# Patient Record
Sex: MALE | Race: BLACK OR AFRICAN AMERICAN | Employment: STUDENT | URBAN - METROPOLITAN AREA
[De-identification: names, ages, dates, MRNs, and addresses within clinical notes are randomized per-mention and may not be internally consistent; named-entity substitution may affect disease eponyms.]

---

## 2017-10-08 ENCOUNTER — HOSPITAL ENCOUNTER (OUTPATIENT)
Age: 22
Discharge: HOME | End: 2017-10-08
Attending: FAMILY MEDICINE
Payer: MEDICAID

## 2017-10-08 ENCOUNTER — APPOINTMENT (OUTPATIENT)
Dept: GENERAL RADIOLOGY | Age: 22
End: 2017-10-08
Attending: FAMILY MEDICINE
Payer: MEDICAID

## 2017-10-08 VITALS
HEART RATE: 62 BPM | TEMPERATURE: 98 F | OXYGEN SATURATION: 100 % | DIASTOLIC BLOOD PRESSURE: 90 MMHG | SYSTOLIC BLOOD PRESSURE: 142 MMHG | RESPIRATION RATE: 16 BRPM

## 2017-10-08 DIAGNOSIS — S63.501A FOREARM SPRAIN, RIGHT, INITIAL ENCOUNTER: ICD-10-CM

## 2017-10-08 DIAGNOSIS — S93.401A MODERATE RIGHT ANKLE SPRAIN, INITIAL ENCOUNTER: Primary | ICD-10-CM

## 2017-10-08 PROCEDURE — 99214 OFFICE O/P EST MOD 30 MIN: CPT

## 2017-10-08 PROCEDURE — 73610 X-RAY EXAM OF ANKLE: CPT | Performed by: FAMILY MEDICINE

## 2017-10-08 PROCEDURE — 73090 X-RAY EXAM OF FOREARM: CPT | Performed by: FAMILY MEDICINE

## 2017-10-08 NOTE — ED PROVIDER NOTES
Patient Seen in: [id number] [facility]    History   Patient presents with:  Upper Extremity Injury (musculoskeletal)  Ankle Injury    Stated Complaint: RIGHT ARM INJURY/RIGHT ANKLE INJURY    HPI    16-year-old male presents with his father steve 142/90  Pulse: 58  Resp: 16  Temp: (!) 97.3 °F (36.3 °C)  Temp src: Temporal  SpO2: 100 %  O2 Device: None (Room air)    Current:/90   Pulse 58   Temp (!) 97.3 °F (36.3 °C) (Temporal)   Resp 16   SpO2 100%         Physical Exam    GENERAL: well devel INJURY/RIGHT ANKLE INJURY  PATIENT STATED HISTORY: (As transcribed by Technologist)  Patient states he rolled his right ankle about 2 weeks ago and again a couple days ago. He has pain and swelling to the lateral malleolus.     FINDINGS:  Moderate to marked [id number]    In 1 week  For re-check      Medications Prescribed:  Current Discharge Medication List

## 2017-10-08 NOTE — ED INITIAL ASSESSMENT (HPI)
Patient states he injured his right ankle 2 weeks ago while playing basketball. Rolled his right foot. Re injured 1 week ago while playing football. Another player stepped on his right foot. c/o right lateral ankle pain.  Also injured his right forearm yest

## (undated) NOTE — LETTER
[facility] [place]  [address] [id number]  Dept: [phone]  Dept Fax: [phone]         October 8, 2017    Patient: Rae Darby IV   YOB: 1995   Date of Visit: 10/8/2017       To Whom It May Concern: